# Patient Record
Sex: FEMALE | Race: ASIAN | NOT HISPANIC OR LATINO | URBAN - METROPOLITAN AREA
[De-identification: names, ages, dates, MRNs, and addresses within clinical notes are randomized per-mention and may not be internally consistent; named-entity substitution may affect disease eponyms.]

---

## 2017-01-09 ENCOUNTER — COMMUNICATION - HEALTHEAST (OUTPATIENT)
Dept: FAMILY MEDICINE | Facility: CLINIC | Age: 47
End: 2017-01-09

## 2017-01-09 DIAGNOSIS — I10 ESSENTIAL HYPERTENSION, BENIGN: ICD-10-CM

## 2017-01-09 DIAGNOSIS — E11.9 TYPE 2 DIABETES MELLITUS (H): ICD-10-CM

## 2017-01-12 ENCOUNTER — OFFICE VISIT - HEALTHEAST (OUTPATIENT)
Dept: FAMILY MEDICINE | Facility: CLINIC | Age: 47
End: 2017-01-12

## 2017-01-12 DIAGNOSIS — E11.9 TYPE 2 DIABETES MELLITUS (H): ICD-10-CM

## 2017-01-12 DIAGNOSIS — N60.81 SEBACEOUS CYST OF BREAST, RIGHT: ICD-10-CM

## 2017-01-12 DIAGNOSIS — E78.01 FAMILIAL HYPERCHOLESTEROLEMIA: ICD-10-CM

## 2017-01-13 LAB — HBA1C MFR BLD: 8.6 % (ref 4.2–6.1)

## 2017-01-16 ENCOUNTER — OFFICE VISIT - HEALTHEAST (OUTPATIENT)
Dept: SURGERY | Facility: CLINIC | Age: 47
End: 2017-01-16

## 2017-01-16 DIAGNOSIS — N63.0 BREAST MASS: ICD-10-CM

## 2017-01-16 ASSESSMENT — MIFFLIN-ST. JEOR: SCORE: 1338.49

## 2017-04-06 ENCOUNTER — COMMUNICATION - HEALTHEAST (OUTPATIENT)
Dept: FAMILY MEDICINE | Facility: CLINIC | Age: 47
End: 2017-04-06

## 2017-04-12 ENCOUNTER — OFFICE VISIT - HEALTHEAST (OUTPATIENT)
Dept: FAMILY MEDICINE | Facility: CLINIC | Age: 47
End: 2017-04-12

## 2017-04-12 DIAGNOSIS — R01.1 CARDIAC MURMUR: ICD-10-CM

## 2017-04-12 DIAGNOSIS — E78.00 PURE HYPERCHOLESTEROLEMIA: ICD-10-CM

## 2017-04-12 DIAGNOSIS — E11.9 TYPE 2 DIABETES MELLITUS (H): ICD-10-CM

## 2017-04-12 LAB — HBA1C MFR BLD: 7.9 % (ref 3.5–6)

## 2017-04-13 LAB
CHOLEST SERPL-MCNC: 164 MG/DL
FASTING STATUS PATIENT QL REPORTED: NO
HDLC SERPL-MCNC: 42 MG/DL
LDLC SERPL CALC-MCNC: 74 MG/DL
TRIGL SERPL-MCNC: 239 MG/DL

## 2017-05-16 ENCOUNTER — COMMUNICATION - HEALTHEAST (OUTPATIENT)
Dept: FAMILY MEDICINE | Facility: CLINIC | Age: 47
End: 2017-05-16

## 2017-05-16 DIAGNOSIS — I10 ESSENTIAL HYPERTENSION, BENIGN: ICD-10-CM

## 2017-05-18 ENCOUNTER — COMMUNICATION - HEALTHEAST (OUTPATIENT)
Dept: FAMILY MEDICINE | Facility: CLINIC | Age: 47
End: 2017-05-18

## 2017-06-29 ENCOUNTER — COMMUNICATION - HEALTHEAST (OUTPATIENT)
Dept: FAMILY MEDICINE | Facility: CLINIC | Age: 47
End: 2017-06-29

## 2017-07-05 ENCOUNTER — COMMUNICATION - HEALTHEAST (OUTPATIENT)
Dept: FAMILY MEDICINE | Facility: CLINIC | Age: 47
End: 2017-07-05

## 2017-07-06 ENCOUNTER — OFFICE VISIT - HEALTHEAST (OUTPATIENT)
Dept: FAMILY MEDICINE | Facility: CLINIC | Age: 47
End: 2017-07-06

## 2017-07-06 ENCOUNTER — AMBULATORY - HEALTHEAST (OUTPATIENT)
Dept: FAMILY MEDICINE | Facility: CLINIC | Age: 47
End: 2017-07-06

## 2017-07-06 DIAGNOSIS — N60.01 BREAST CYST, RIGHT: ICD-10-CM

## 2017-07-06 DIAGNOSIS — Z01.818 PRE-OP EXAM: ICD-10-CM

## 2017-07-06 ASSESSMENT — MIFFLIN-ST. JEOR: SCORE: 1320.35

## 2017-07-24 ENCOUNTER — COMMUNICATION - HEALTHEAST (OUTPATIENT)
Dept: FAMILY MEDICINE | Facility: CLINIC | Age: 47
End: 2017-07-24

## 2017-07-24 DIAGNOSIS — E11.9 TYPE 2 DIABETES MELLITUS (H): ICD-10-CM

## 2017-07-24 DIAGNOSIS — E78.5 HYPERLIPIDEMIA: ICD-10-CM

## 2018-05-08 ENCOUNTER — TELEPHONE (OUTPATIENT)
Dept: OTHER | Facility: OUTPATIENT CENTER | Age: 48
End: 2018-05-08

## 2018-05-08 NOTE — TELEPHONE ENCOUNTER
PER DANIKA @ [facility] [employer] - NO CO-PAY, 10% CO-INS, $2400 DEDUCT (0MET) W/ AN OOPM $4000 ($1611.00MET) NO AUTH TESTING/85270 - NO EXCLUSIONS, LVM TO DANIELA @ [facility]. REF #[id number].

## 2018-05-08 NOTE — TELEPHONE ENCOUNTER
[facility] Telephone Intake    Date:  May 8, 2018  Client Name:  Tonya Albert  Preferred Name: Ewelina      MRN:  [id number]   :  1970       Age:  47 year old     Presenting Problem / Reason for Assessment   (Clinical History &Symptoms):     Ewelina calling to complete intake for couples counseling. She was at work and could not speak to the details of why she is coming in for an appt.     Suggested Program:  REST    Length of time experiencing Symptoms: 11 years    Seen Other Providers (if so, where):  [facility]. : Dr. Vy Arguello [facility]  Therapist:  No  Psychiatrist:  No    Is presenting concern primarily sexual or mental health:      Couples:  Is client bringing partner: Not to DA    Has appointment been scheduled for partner:  Yes (18)  Will this be couples counseling:  Yes    Referral Source:  Dr. Vy Arguello [facility]    Legal:  n/a    Follow Up:    Insurance Benefits to be evaluated.  Note will be entered when validated.    Patient wishes to be contacted regarding Insurance benefits:  YES    Please Verify Registration    Please send Welcome Packet and document date sent.

## 2018-05-24 ENCOUNTER — COMMUNICATION - HEALTHEAST (OUTPATIENT)
Dept: FAMILY MEDICINE | Facility: CLINIC | Age: 48
End: 2018-05-24

## 2018-06-04 ENCOUNTER — OFFICE VISIT (OUTPATIENT)
Dept: OTHER | Facility: OUTPATIENT CENTER | Age: 48
End: 2018-06-04
Payer: COMMERCIAL

## 2018-06-04 DIAGNOSIS — F43.23 ADJUSTMENT DISORDER WITH MIXED ANXIETY AND DEPRESSED MOOD: ICD-10-CM

## 2018-06-04 ASSESSMENT — ANXIETY QUESTIONNAIRES
GAD7 TOTAL SCORE: 7
5. BEING SO RESTLESS THAT IT IS HARD TO SIT STILL: SEVERAL DAYS
3. WORRYING TOO MUCH ABOUT DIFFERENT THINGS: SEVERAL DAYS
1. FEELING NERVOUS, ANXIOUS, OR ON EDGE: SEVERAL DAYS
7. FEELING AFRAID AS IF SOMETHING AWFUL MIGHT HAPPEN: SEVERAL DAYS
2. NOT BEING ABLE TO STOP OR CONTROL WORRYING: SEVERAL DAYS
6. BECOMING EASILY ANNOYED OR IRRITABLE: SEVERAL DAYS

## 2018-06-04 ASSESSMENT — PATIENT HEALTH QUESTIONNAIRE - PHQ9: 5. POOR APPETITE OR OVEREATING: SEVERAL DAYS
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
## 2018-06-04 NOTE — MR AVS SNAPSHOT
After Visit Summary   2018    Tonya Albert    MRN: [id number]           Patient Information     Date Of Birth          1970        Visit Information        Provider Department      2018 11:00 AM Aleta Whalen PsyD [facility]        Today's Diagnoses     Adjustment disorder with mixed anxiety and depressed mood           Follow-ups after your visit        Who to contact     Please call your clinic at [phone] to:    Ask questions about your health    Make or cancel appointments    Discuss your medicines    Learn about your test results    Speak to your doctor            Additional Information About Your Visit        MyChart Information     [email or web address] is an electronic gateway that provides easy, online access to your medical records. With [email or web address], you can request a clinic appointment, read your test results, renew a prescription or communicate with your care team.     To sign up for [email or web address] visit the website at [email or web address]   You will be asked to enter the access code listed below, as well as some personal information. Please follow the directions to create your username and password.     Your access code is: [email or web address]  Expires: 2018  4:04 PM     Your access code will  in 90 days. If you need help or a new code, please contact your [facility] or call [phone] for assistance.        Care EveryWhere ID     This is your Care EveryWhere ID. This could be used by other organizations to access your [place] medical records  [id number]         Blood Pressure from Last 3 Encounters:   No data found for BP    Weight from Last 3 Encounters:   No data found for Wt              We Performed the Following     Diagnostic Assessment (complete) [28705]        Primary Care Provider    None Specified       No primary provider on file.        Equal Access to Services     AIXA ARCHER : kevin Perez  zoë alas waxay idiin hayaasolo aguayoarthur nickolasmark lasolissolo elvira. So [facility] [phone].    ATENCIÓN: Si kristal villalba, tiene a vidal disposición servicios gratuitos de asistencia lingüística. Llame al [phone].    We comply with applicable federal civil rights laws and [place] laws. We do not discriminate on the basis of race, color, national origin, age, disability, sex, sexual orientation, or gender identity.            Thank you!     Thank you for choosing [facility]  for your care. Our goal is always to provide you with excellent care. Hearing back from our patients is one way we can continue to improve our services. Please take a few minutes to complete the written survey that you may receive in the mail after your visit with us. Thank you!             Your Updated Medication List - Protect others around you: Learn how to safely use, store and throw away your medicines at www.disposemymeds.org.      Notice  As of 6/4/2018 11:59 PM    You have not been prescribed any medications.

## 2018-06-25 PROBLEM — F43.23 ADJUSTMENT DISORDER WITH MIXED ANXIETY AND DEPRESSED MOOD: Status: ACTIVE | Noted: 2018-06-25

## 2018-06-26 ASSESSMENT — PATIENT HEALTH QUESTIONNAIRE - PHQ9: SUM OF ALL RESPONSES TO PHQ QUESTIONS 1-9: 15

## 2018-06-26 ASSESSMENT — ANXIETY QUESTIONNAIRES: GAD7 TOTAL SCORE: 7

## 2018-07-01 NOTE — PROGRESS NOTES
I did not personally see the patient.  I reviewed and agree with the assessment and plan as documented in this note. Narda Vanegas, PhD, LP

## 2018-07-20 ENCOUNTER — OFFICE VISIT - HEALTHEAST (OUTPATIENT)
Dept: FAMILY MEDICINE | Facility: CLINIC | Age: 48
End: 2018-07-20

## 2018-07-20 DIAGNOSIS — I10 ESSENTIAL HYPERTENSION, BENIGN: ICD-10-CM

## 2018-07-20 DIAGNOSIS — Z12.31 VISIT FOR SCREENING MAMMOGRAM: ICD-10-CM

## 2018-07-20 DIAGNOSIS — E78.01 FAMILIAL HYPERCHOLESTEROLEMIA: ICD-10-CM

## 2018-07-20 DIAGNOSIS — E11.9 TYPE 2 DIABETES MELLITUS (H): ICD-10-CM

## 2018-07-20 LAB
ALBUMIN SERPL-MCNC: 3.9 G/DL (ref 3.5–5)
ALP SERPL-CCNC: 78 U/L (ref 45–120)
ALT SERPL W P-5'-P-CCNC: 34 U/L (ref 0–45)
ANION GAP SERPL CALCULATED.3IONS-SCNC: 11 MMOL/L (ref 5–18)
AST SERPL W P-5'-P-CCNC: 20 U/L (ref 0–40)
BASOPHILS # BLD AUTO: 0 THOU/UL (ref 0–0.2)
BASOPHILS NFR BLD AUTO: 0 % (ref 0–2)
BILIRUB SERPL-MCNC: 0.4 MG/DL (ref 0–1)
BUN SERPL-MCNC: 11 MG/DL (ref 8–22)
CALCIUM SERPL-MCNC: 9.4 MG/DL (ref 8.5–10.5)
CHLORIDE BLD-SCNC: 103 MMOL/L (ref 98–107)
CHOLEST SERPL-MCNC: 160 MG/DL
CO2 SERPL-SCNC: 24 MMOL/L (ref 22–31)
CREAT SERPL-MCNC: 0.76 MG/DL (ref 0.6–1.1)
CREAT UR-MCNC: 56.1 MG/DL
EOSINOPHIL # BLD AUTO: 0.1 THOU/UL (ref 0–0.4)
EOSINOPHIL NFR BLD AUTO: 1 % (ref 0–6)
ERYTHROCYTE [DISTWIDTH] IN BLOOD BY AUTOMATED COUNT: 11.7 % (ref 11–14.5)
FASTING STATUS PATIENT QL REPORTED: ABNORMAL
GFR SERPL CREATININE-BSD FRML MDRD: >60 ML/MIN/1.73M2
GLUCOSE BLD-MCNC: 317 MG/DL (ref 70–125)
HBA1C MFR BLD: 11.3 % (ref 3.5–6)
HCT VFR BLD AUTO: 42.3 % (ref 35–47)
HDLC SERPL-MCNC: 44 MG/DL
HGB BLD-MCNC: 15.1 G/DL (ref 12–16)
LDLC SERPL CALC-MCNC: 69 MG/DL
LYMPHOCYTES # BLD AUTO: 2.1 THOU/UL (ref 0.8–4.4)
LYMPHOCYTES NFR BLD AUTO: 36 % (ref 20–40)
MCH RBC QN AUTO: 30.3 PG (ref 27–34)
MCHC RBC AUTO-ENTMCNC: 35.7 G/DL (ref 32–36)
MCV RBC AUTO: 85 FL (ref 80–100)
MICROALBUMIN UR-MCNC: 2.19 MG/DL (ref 0–1.99)
MICROALBUMIN/CREAT UR: 39 MG/G
MONOCYTES # BLD AUTO: 0.4 THOU/UL (ref 0–0.9)
MONOCYTES NFR BLD AUTO: 7 % (ref 2–10)
NEUTROPHILS # BLD AUTO: 3.2 THOU/UL (ref 2–7.7)
NEUTROPHILS NFR BLD AUTO: 56 % (ref 50–70)
PLATELET # BLD AUTO: 244 THOU/UL (ref 140–440)
PMV BLD AUTO: 8.2 FL (ref 7–10)
POTASSIUM BLD-SCNC: 4.3 MMOL/L (ref 3.5–5)
PROT SERPL-MCNC: 6.9 G/DL (ref 6–8)
RBC # BLD AUTO: 4.98 MILL/UL (ref 3.8–5.4)
SODIUM SERPL-SCNC: 138 MMOL/L (ref 136–145)
TRIGL SERPL-MCNC: 234 MG/DL
WBC: 5.7 THOU/UL (ref 4–11)

## 2018-07-20 RX ORDER — ATORVASTATIN CALCIUM 20 MG/1
20 TABLET, FILM COATED ORAL AT BEDTIME
Qty: 90 TABLET | Refills: 0 | Status: SHIPPED | OUTPATIENT
Start: 2018-07-20

## 2018-07-20 ASSESSMENT — MIFFLIN-ST. JEOR: SCORE: 1342.45

## 2018-07-25 ENCOUNTER — COMMUNICATION - HEALTHEAST (OUTPATIENT)
Dept: PHARMACY | Facility: CLINIC | Age: 48
End: 2018-07-25

## 2018-08-03 ENCOUNTER — COMMUNICATION - HEALTHEAST (OUTPATIENT)
Dept: FAMILY MEDICINE | Facility: CLINIC | Age: 48
End: 2018-08-03

## 2018-11-27 ENCOUNTER — COMMUNICATION - HEALTHEAST (OUTPATIENT)
Dept: FAMILY MEDICINE | Facility: CLINIC | Age: 48
End: 2018-11-27

## 2018-11-27 DIAGNOSIS — I10 ESSENTIAL HYPERTENSION, BENIGN: ICD-10-CM

## 2018-11-27 DIAGNOSIS — E78.5 HYPERLIPIDEMIA: ICD-10-CM

## 2019-01-07 ENCOUNTER — COMMUNICATION - HEALTHEAST (OUTPATIENT)
Dept: FAMILY MEDICINE | Facility: CLINIC | Age: 49
End: 2019-01-07

## 2019-01-07 DIAGNOSIS — E78.5 HYPERLIPIDEMIA: ICD-10-CM

## 2019-02-05 ENCOUNTER — COMMUNICATION - HEALTHEAST (OUTPATIENT)
Dept: PHARMACY | Facility: CLINIC | Age: 49
End: 2019-02-05

## 2019-02-07 ENCOUNTER — OFFICE VISIT - HEALTHEAST (OUTPATIENT)
Dept: FAMILY MEDICINE | Facility: CLINIC | Age: 49
End: 2019-02-07

## 2019-02-07 DIAGNOSIS — E78.5 HYPERLIPIDEMIA: ICD-10-CM

## 2019-02-07 DIAGNOSIS — I10 ESSENTIAL HYPERTENSION, BENIGN: ICD-10-CM

## 2019-02-07 DIAGNOSIS — E11.9 TYPE 2 DIABETES MELLITUS (H): ICD-10-CM

## 2019-02-07 LAB
ALBUMIN SERPL-MCNC: 4.1 G/DL (ref 3.5–5)
ALP SERPL-CCNC: 53 U/L (ref 45–120)
ALT SERPL W P-5'-P-CCNC: 44 U/L (ref 0–45)
ANION GAP SERPL CALCULATED.3IONS-SCNC: 12 MMOL/L (ref 5–18)
AST SERPL W P-5'-P-CCNC: 25 U/L (ref 0–40)
BILIRUB SERPL-MCNC: 0.9 MG/DL (ref 0–1)
BUN SERPL-MCNC: 11 MG/DL (ref 8–22)
CALCIUM SERPL-MCNC: 9.4 MG/DL (ref 8.5–10.5)
CHLORIDE BLD-SCNC: 103 MMOL/L (ref 98–107)
CHOLEST SERPL-MCNC: 141 MG/DL
CO2 SERPL-SCNC: 23 MMOL/L (ref 22–31)
CREAT SERPL-MCNC: 0.75 MG/DL (ref 0.6–1.1)
FASTING STATUS PATIENT QL REPORTED: ABNORMAL
GFR SERPL CREATININE-BSD FRML MDRD: >60 ML/MIN/1.73M2
GLUCOSE BLD-MCNC: 160 MG/DL (ref 70–125)
HBA1C MFR BLD: 9.4 % (ref 3.5–6)
HDLC SERPL-MCNC: 43 MG/DL
LDLC SERPL CALC-MCNC: 70 MG/DL
POTASSIUM BLD-SCNC: 4 MMOL/L (ref 3.5–5)
PROT SERPL-MCNC: 7.1 G/DL (ref 6–8)
SODIUM SERPL-SCNC: 138 MMOL/L (ref 136–145)
TRIGL SERPL-MCNC: 142 MG/DL
TSH SERPL DL<=0.005 MIU/L-ACNC: 1.27 UIU/ML (ref 0.3–5)

## 2019-02-07 RX ORDER — LISINOPRIL 20 MG/1
20 TABLET ORAL DAILY
Qty: 90 TABLET | Refills: 0 | Status: SHIPPED | OUTPATIENT
Start: 2019-02-07

## 2019-02-07 ASSESSMENT — MIFFLIN-ST. JEOR: SCORE: 1298.21

## 2019-02-12 ENCOUNTER — COMMUNICATION - HEALTHEAST (OUTPATIENT)
Dept: HEALTH INFORMATION MANAGEMENT | Facility: CLINIC | Age: 49
End: 2019-02-12

## 2019-02-12 ENCOUNTER — COMMUNICATION - HEALTHEAST (OUTPATIENT)
Dept: TELEHEALTH | Facility: CLINIC | Age: 49
End: 2019-02-12

## 2019-04-25 ENCOUNTER — COMMUNICATION - HEALTHEAST (OUTPATIENT)
Dept: FAMILY MEDICINE | Facility: CLINIC | Age: 49
End: 2019-04-25

## 2019-05-10 ENCOUNTER — COMMUNICATION - HEALTHEAST (OUTPATIENT)
Dept: FAMILY MEDICINE | Facility: CLINIC | Age: 49
End: 2019-05-10

## 2019-05-10 DIAGNOSIS — E11.9 TYPE 2 DIABETES MELLITUS (H): ICD-10-CM

## 2019-11-01 ENCOUNTER — COMMUNICATION - HEALTHEAST (OUTPATIENT)
Dept: FAMILY MEDICINE | Facility: CLINIC | Age: 49
End: 2019-11-01

## 2021-05-28 NOTE — TELEPHONE ENCOUNTER
2nd attempt to reach patient. LMTCB and schedule a DM with PCP. Please assist with scheduling when patient calls back.    Will make one last follow-up call if needed in 1 week.

## 2021-05-28 NOTE — TELEPHONE ENCOUNTER
Left message to call back for: Pt  Information to relay to patient:  Last visit with PCP was back in 2/2018.  I was going to call pt to make a follow up appt for DM check for next month but seems like from last OFV, pt is moving to [place]. Please confirm if pt already moved and transferred out of care.  Did she establish care with a new MD in [place] already?  If so, please change PCP to no PCP.  If not, encourage pt to establish care with a new PCP.

## 2021-05-28 NOTE — TELEPHONE ENCOUNTER
Refill Approved    Rx renewed per Medication Renewal Policy. Medication was last renewed on 2/7/2019.   Last office visit: 2/7/2019 with Dr AVERY Espinoza. .    Josie Leiva, [facility] Triage/Med Refill 5/11/2019     Requested Prescriptions   Pending Prescriptions Disp Refills     metFORMIN (GLUCOPHAGE) 1000 MG tablet [Pharmacy Med Name: METFORMIN 1000MG TABLETS] 180 tablet 0     Sig: TAKE 1 TABLET BY MOUTH TWICE DAILY WITH MEALS       Metformin Refill Protocol Passed - 5/10/2019  2:13 PM        Passed - Blood pressure in last 12 months     BP Readings from Last 1 Encounters:   02/07/19 120/80             Passed - LFT or AST or ALT in last 12 months     Albumin   Date Value Ref Range Status   02/07/2019 4.1 3.5 - 5.0 g/dL Final     Bilirubin, Total   Date Value Ref Range Status   02/07/2019 0.9 0.0 - 1.0 mg/dL Final     Alkaline Phosphatase   Date Value Ref Range Status   02/07/2019 53 45 - 120 U/L Final     AST   Date Value Ref Range Status   02/07/2019 25 0 - 40 U/L Final     ALT   Date Value Ref Range Status   02/07/2019 44 0 - 45 U/L Final     Protein, Total   Date Value Ref Range Status   02/07/2019 7.1 6.0 - 8.0 g/dL Final                Passed - GFR or Serum Creatinine in last 6 months     GFR MDRD Non Af Amer   Date Value Ref Range Status   02/07/2019 >60 >60 mL/min/1.73m2 Final     GFR MDRD Af Amer   Date Value Ref Range Status   02/07/2019 >60 >60 mL/min/1.73m2 Final             Passed - Visit with PCP or prescribing provider visit in last 6 months or next 3 months     Last office visit with prescriber/PCP: 2/7/2019 OR same dept: 2/7/2019 Vy Espinoza MD OR same specialty: 2/7/2019 Vy Espinoza MD Last physical: Visit date not found Last MTM visit: Visit date not found         Next appt within 3 mo: Visit date not found  Next physical within 3 mo: Visit date not found  Prescriber OR PCP: Vy Espinoza MD  Last diagnosis associated with med order: 1. Type 2 diabetes mellitus (H)  - metFORMIN (GLUCOPHAGE)  1000 MG tablet [Pharmacy Med Name: METFORMIN 1000MG TABLETS]; TAKE 1 TABLET BY MOUTH TWICE DAILY WITH MEALS  Dispense: 180 tablet; Refill: 0     If protocol passes may refill for 12 months if within 3 months of last provider visit (or a total of 15 months).           Passed - A1C in last 6 months     Hemoglobin A1c   Date Value Ref Range Status   02/07/2019 9.4 (H) 3.5 - 6.0 % Final               Passed - Microalbumin in last year      Microalbumin, Random Urine   Date Value Ref Range Status   07/20/2018 2.19 (H) 0.00 - 1.99 mg/dL Final

## 2021-05-28 NOTE — TELEPHONE ENCOUNTER
LMTCB and schedule DM with PCP. Please assist with scheduling when patient calls back.     Will make follow-up call if needed in 1 week.

## 2021-05-28 NOTE — TELEPHONE ENCOUNTER
Talked with pt. She knows what she needs to schedule and she will call back to care connection or schedule via [employer]. No further action needed.

## 2021-05-30 VITALS — WEIGHT: 162.5 LBS | BODY MASS INDEX: 28.79 KG/M2

## 2021-05-30 VITALS — WEIGHT: 163.5 LBS | BODY MASS INDEX: 28.96 KG/M2

## 2021-05-30 VITALS — BODY MASS INDEX: 28.88 KG/M2 | WEIGHT: 163 LBS | HEIGHT: 63 IN

## 2021-05-31 VITALS — HEIGHT: 63 IN | BODY MASS INDEX: 28.17 KG/M2 | WEIGHT: 159 LBS

## 2021-06-01 VITALS — HEIGHT: 63 IN | WEIGHT: 163 LBS | BODY MASS INDEX: 28.88 KG/M2

## 2021-06-02 VITALS — WEIGHT: 154.12 LBS | HEIGHT: 63 IN | BODY MASS INDEX: 27.31 KG/M2

## 2021-06-08 NOTE — PROGRESS NOTES
HPI:  Patient presents today for their routine diabetes visit.    1) Diabetes Type 2  She has had a lot happen in the past year.  She had to change jobs, since she was about get laid off.  During that time, got chest pains 9/2016 and neighbor took her to [facility] and was admitted for observation.  Workup was negative.  EKG was negative.  TnI negative.  Her blood sugars ran high during her hospitalization.  She was discharged with metformin 1,000 BID, ran out, no refills, so went back to 750 mg once a day.  Chest pain was related to stress.  Workup was negative.  No chest pain since.  No chest pain with exertion and she is training for the [place] marathon.    Changes to their medication:  Increased to 1,000 BID during a chest pain workup, but ran out, so she had been taking 750 mg XR once daily these past 3 months.  Fasting blood sugars: 133 am fasting.  Evening 260s  Medication side effects:none.  Blood Pressures: at goal on lisinopril.  Hyperlipidemia: previously well controlled  Exercise: signed up for [place] marathon.  Diet: not as good as it could be.  Last Eye Exam: Per patient has upcoming appointment.    Other issues:     2) Sebaceous cyst: Would like sebaceous cyst at right bra line removed.      3) Hypercholesteremia:  Currently on zocor 20 mg.  Tolerating it well.  No issues with muscle aches.    ROS:  Gen: negative for weight gain  CV: No chest pain, palpitation, swelling in the extremities, dyspnea on exertion  RESP: No URI symptoms, shortness of breath  : No polyuria, hematuria  NEURO: No HA, numbness, tingling.    Objective:  Visit Vitals     /74 (Patient Site: Left Arm, Patient Position: Sitting)     Pulse 69     Wt 162 lb 8 oz (73.7 kg)     SpO2 98%     BMI 28.79 kg/m2     General appearance: alert, appears stated age and cooperative  Eyes: conjunctivae/corneas clear. PERRL, EOM's intact. Fundi benign.  Ears: normal TM's and external ear canals both ears  Nose: Nares normal. Septum  midline. Mucosa normal. No drainage or sinus tenderness.  Throat: lips, mucosa, and tongue normal; teeth and gums normal  Neck: no adenopathy, no carotid bruit, no JVD, supple, symmetrical, trachea midline and thyroid not enlarged, symmetric, no tenderness/mass/nodules  Lungs: clear to auscultation bilaterally  Heart: regular rate and rhythm, S1, S2 normal, no murmur, click, rub or gallop  Extremities: normal foot exam, normal monofilament.  Dryness and callus, but no cracking.    Lab:   A1C = 7.6    Assessment/Plan:  Tonya was seen today for diabetes and medication refill.    Diagnoses and all orders for this visit:    Type 2 diabetes mellitus  -     metFORMIN (GLUCOPHAGE) 1000 MG tablet; Take 1 tablet (1,000 mg total) by mouth 2 (two) times a day with meals.  -     Glycosylated Hemoglobin A1c  -     Comprehensive Metabolic Panel  -     Microalbumin, Random Urine  Increase metformin to 1,000 mg BID.    Patient is training for the marathon so hopefully her exercise regimen will improve.    Patient has issues with follow up and compliance.  I did ask her to schedule a 3 month follow up today.    Familial hypercholesterolemia  -     simvastatin (ZOCOR) 40 MG tablet; Take 1 tablet (40 mg total) by mouth bedtime.  -     Comprehensive Metabolic Panel  Increase simvastatin to 40 mg to meet ACC/AHA recommendations.  Will need to recheck liver function and lipid at upcoming visit.    Sebaceous cyst of breast, right  -     Ambulatory referral to General Surgery

## 2021-06-08 NOTE — PROGRESS NOTES
GENERAL SURGICAL CONSULTATION    I was requested by Vy Espinoza MD to consult on this pt to evaluate them for a mass in the right breast.    HPI:  This is a 46 y.o. female here today with a mass which is developed in the right breast.  It is at the 6 o'clock position along the inframammary fold.  The mass is about a centimeter in diameter.  It is nontender to palpation.  There is been no erythema or drainage associated with this mass.  The patient denies any axillary adenopathy.  She denies any family history of breast cancer.     Allergies:Review of patient's allergies indicates no known allergies.    History reviewed. No pertinent past medical history.    History reviewed. No pertinent past surgical history.    CURRENT MEDS:  Current Outpatient Prescriptions   Medication Sig Dispense Refill     levonorgestrel (MIRENA) 20 mcg/24 hr (5 years) IUD 1 each by Intrauterine route once.       lisinopril (PRINIVIL,ZESTRIL) 10 MG tablet TAKE ONE TABLET BY MOUTH DAILY 90 tablet 0     metFORMIN (GLUCOPHAGE) 1000 MG tablet Take 1 tablet (1,000 mg total) by mouth 2 (two) times a day with meals. 180 tablet 0     ONETOUCH ULTRA TEST strips USE TO TEST ONCE PER DAY AS NEEDED 100 strip 0     simvastatin (ZOCOR) 40 MG tablet Take 1 tablet (40 mg total) by mouth bedtime. 90 tablet 1     Current Facility-Administered Medications   Medication Dose Route Frequency Provider Last Rate Last Dose     ibuprofen tablet 600 mg (ADVIL,MOTRIN)  600 mg Oral Once Vy Espinoza MD           Family History   Problem Relation Age of Onset     Diabetes Father      Kidney failure Father      Hypertension Father      Hyperlipidemia Father      No Medical Problems Mother      Hypertension Brother      malignant hypertension 230mmHg 2015     No Medical Problems Sister      No Medical Problems Daughter      No Medical Problems Maternal Grandmother      No Medical Problems Maternal Grandfather      No Medical Problems Paternal Grandmother      No Medical  "Problems Paternal Grandfather      No Medical Problems Maternal Aunt      No Medical Problems Paternal Aunt      BRCA 1/2 Neg Hx      Breast cancer Neg Hx      Cancer Neg Hx      Colon cancer Neg Hx      Endometrial cancer Neg Hx      Ovarian cancer Neg Hx      Family history is not pertinent to this patients Chief Complaint.     reports that she has never smoked. She does not have any smokeless tobacco history on file. She reports that she does not use illicit drugs.    Review of Systems -   10 point Review of systems is negative except for; as mentioned above in HPI and PMHx    Visit Vitals     BP (!) 147/92 (Patient Site: Left Arm, Patient Position: Sitting, Cuff Size: Adult Large)     Pulse 67     Temp 98.1  F (36.7  C) (Oral)     Resp 16     Ht 5' 3\" (1.6 m)     Wt 163 lb (73.9 kg)     SpO2 97%     BMI 28.87 kg/m2     Body mass index is 28.87 kg/(m^2).    EXAM:  GENERAL: Well developed female  HEENT: EOMI, Anicteric Sclera, Moist Mucous Membranes,  In Mouth the pt does not have redness or bleeding gums  CARDIOVASCULAR: RRR w/out murmur   CHEST/LUNG: Clear to Auscultation  BREAST:  Right breast has a palpable 1 cm mass in the lower aspect of the right breast just below the level of the nipple areolar complex.  I do not appreciate any right sided axillary adenopathy.  The left breast exam is unremarkable I do not palpate any masses on the left breast or any left axillary adenopathy.  ABDOMEN:  Non tender to palpation, +BS  MUSCULOSKELETAL:  No deformities with good range of motion in all extremities  NEURO: She is ambulatory with good strength in both legs.  HEME/LYMPH: No Cervical Adenopathy or tenderness.     IMAGES:  RIGHT BREAST ULTRASOUND: Scans of the palpable abnormality 6:00 and 5 cm from nipple. There is a small round structure measuring 7 x 7 x 5 mm that appears to be dermal in location. This lesion has sharp borders and mild through transmission. Findings are  very consistent with a benign dermal " lesion, such as a sebaceous cyst.     IMPRESSION:   CONCLUSION:  Superficial lump inferior aspect of right breast most consistent with a benign dermal cyst or skin lesion. Further workup should be based on suspicion of clinical exam. Patient should return to annual screening protocol.     Assessment/Plan:  Patient has a mass in the right breast which is fairly well circumscribed and on ultrasound from 6 months ago looks to be consistent with that of a cyst.  Still this lesion has not gone away and I believe the safest way to proceed is to do an excisional biopsy and evaluate this mass under the microscope.    Excisional biopsy of right breast mass.      Wayne Schulte MD  [facility]  [phone]

## 2021-06-10 NOTE — PROGRESS NOTES
HPI:  Patient presents today for their routine diabetes visit.    Changes to their medication:  She is now taking metformin 1000 mg twice a day, but sometimes forgets her evening dose.  Last A1C was 8.9  Fasting blood sugars:140s  Medication side effects: none  Blood Pressures: at goal  Hyperlipidemia: on simvastatin 40 mg now.  She is tolerating it well.   Exercise: training for [place] marathon, currently running about 6 miles.  Diet: continuing to work on it.  Last Eye Exam: has not gotten an appointment yet.    Other issues:    Sebaceous cyst of the breast:  Needs a new referral for removal.    ROS:  Gen: negative for weight gain  CV: No chest pain, palpitation, swelling in the extremities, dyspnea on exertion  RESP: No URI symptoms, shortness of breath  : No polyuria, hematuria  NEURO: No HA, numbness, tingling.    Patient Active Problem List    Diagnosis Date Noted     Need for hepatitis vaccination 02/15/2015     Hepatitis vaccination status unknown 02/11/2015     Pure hypercholesterolemia 12/08/2014     Infected Nonvenomous Insect Bite      Lymphadenopathy      Benign Essential Hypertension      Acute Upper Respiratory Infection      Urinary Incontinence      Type 2 diabetes mellitus          Current Outpatient Prescriptions:      levonorgestrel (MIRENA) 20 mcg/24 hr (5 years) IUD, 1 each by Intrauterine route once., Disp: , Rfl:      lisinopril (PRINIVIL,ZESTRIL) 10 MG tablet, TAKE ONE TABLET BY MOUTH DAILY, Disp: 90 tablet, Rfl: 0     metFORMIN (GLUCOPHAGE) 1000 MG tablet, Take 1 tablet (1,000 mg total) by mouth 2 (two) times a day with meals., Disp: 180 tablet, Rfl: 0     ONETOUCH ULTRA TEST strips, USE TO TEST ONCE PER DAY AS NEEDED, Disp: 100 strip, Rfl: 0     simvastatin (ZOCOR) 40 MG tablet, Take 1 tablet (40 mg total) by mouth bedtime., Disp: 90 tablet, Rfl: 1    Current Facility-Administered Medications:      ibuprofen tablet 600 mg (ADVIL,MOTRIN), 600 mg, Oral, Once, Vy Espinoza,  MD      Objective:  /70 (Patient Site: Left Arm, Patient Position: Sitting)  Pulse 81  Wt 163 lb 8 oz (74.2 kg)  SpO2 99%  Breastfeeding? No Comment: IUD- mirena  BMI 28.96 kg/m2  General appearance: alert, appears stated age and cooperative  Eyes: conjunctivae/corneas clear. PERRL, EOM's intact. Fundi benign.  Ears: normal TM's and external ear canals both ears  Nose: Nares normal. Septum midline. Mucosa normal. No drainage or sinus tenderness.  Throat: lips, mucosa, and tongue normal; teeth and gums normal  Neck: no adenopathy, no carotid bruit, no JVD, supple, symmetrical, trachea midline and thyroid not enlarged, symmetric, no tenderness/mass/nodules  Lungs: clear to auscultation bilaterally  Heart: regular rate and rhythm, S1, S2 normal, no murmur, click, rub or gallop  Extremities: no calluses, normal monofilament.    Lab Results   Component Value Date    HGBA1C 7.9 (H) 04/12/2017     Assessment/Plan:  Tonya was seen today for diabetes.    Diagnoses and all orders for this visit:    Type 2 diabetes mellitus  -     Glycosylated Hemoglobin A1c   Barely at goal with metformin BID.   Discussed adding a GLP medication, but patient would prefer to continue to work on her exercise to improve her numbers as she trains for the [place] marathon.  Encouraged her to make her eye appointment.    Pure hypercholesterolemia  -     Lipid Cascade  Simvastatin was increased to 40 mg at the last visit.  She is tolerating the new dose well, no increase in muscle aches.  Will check her labs.    Cardiac murmur  -     HM1(CBC and Differential)  -     HM1 (CBC with Diff)  A slight 2/6 murmur heard today.  Patient just had a stress echo at [place] in 2016 due to an episode of chest pain.  No valvular abnormality.  Will check a CBC for anemia.      > 25 min spent with patient.  > 50% in counseling and coordination of care.

## 2021-06-11 NOTE — PROGRESS NOTES
"     Subjective:     Tonya is a 46 y.o. female here for a family medicine pre-operative consultation. The exam is requested by Dr. Kirkpatrick in preparation for right breast cyst to be performed at [facility] on July 11 2017. Today s examination on 07/07/17 is done to review the underlying surgical condition, clear for anesthesia, and review medical problems with appropriate changes in medications.  Right breast cyst has been present for several months, patient has been experiencing some mild discomfort, she like the cyst to be excised.  Tonya has tolerated previous surgeries well without bleeding or anesthesia difficulty.      Review of Systems  Constitutional: negative for anorexia, fatigue and malaise  Eyes: negative for redness  Ears, nose, mouth, throat, and face: negative for earaches, epistaxis, nasal congestion and sore throat  Respiratory: negative for cough, dyspnea on exertion and wheezing  Cardiovascular: negative for chest pain, fatigue, irregular heart beat, near-syncope, orthopnea, palpitations, paroxysmal nocturnal dyspnea and syncope  Gastrointestinal: negative for abdominal pain, change in bowel habits, constipation, diarrhea, dyspepsia, dysphagia, melena, nausea, odynophagia and vomiting  Genitourinary:negative for abnormal menstrual periods  Hematologic/lymphatic: negative for easy bruising and lymphadenopathy  Musculoskeletal:negative for arthralgias, bone pain, myalgias and stiff joints  Neurological: negative for coordination problems, dizziness, gait problems, headaches, tremors, vertigo and weakness  Behavioral/Psych: negative for anxiety, depression, fatigue and irritability  Endocrine: negative for temperature intolerance  Allergic/Immunologic: negative for urticaria      Objective:      /88 (Patient Site: Right Arm)  Pulse 68  Temp 97.8  F (36.6  C) (Oral)   Resp 14  Ht 5' 3\" (1.6 m)  Wt 159 lb (72.1 kg)  SpO2 96%  BMI 28.17 kg/m2    General Appearance:    Alert, " cooperative, no distress, appears stated age   Head:    Normocephalic, without obvious abnormality, atraumatic   Eyes:    PERRL, conjunctiva/corneas clear, EOM's intact, fundi     benign, both eyes        Ears:    Normal TM's and external ear canals, both ears   Nose:   Nares normal, septum midline, mucosa normal, no drainage    or sinus tenderness   Throat:   Lips, mucosa, and tongue normal; teeth and gums normal   Neck:   Supple, symmetrical, trachea midline, no adenopathy;        thyroid:  No enlargement/tenderness/nodules; no carotid    bruit or JVD   Back:     Symmetric, no curvature, ROM normal, no CVA tenderness   Lungs:     Clear to auscultation bilaterally, respirations unlabored   Chest wall:    No tenderness or deformity   Heart:    Regular rate and rhythm, S1 and S2 normal, no murmur, rub   or gallop   Abdomen:     Soft, non-tender, bowel sounds active all four quadrants,     no masses, no organomegaly   Musculoskeletal:   no limitation of range of motion, no joint tenderness    Extremities:   Extremities normal, atraumatic, no cyanosis or edema   Pulses:   2+ and symmetric all extremities   Skin:   Skin color, texture, turgor normal, no rashes or lesions   Lymph nodes:   Cervical, supraclavicular, and axillary nodes normal   Neurologic:   CNII-XII intact. Normal strength, sensation and reflexes       throughout     No past medical history on file.  No past surgical history on file.    Current Outpatient Prescriptions:      levonorgestrel (MIRENA) 20 mcg/24 hr (5 years) IUD, 1 each by Intrauterine route once., Disp: , Rfl:      lisinopril (PRINIVIL,ZESTRIL) 10 MG tablet, TAKE 1 TABLET BY MOUTH DAILY, Disp: 90 tablet, Rfl: 2     metFORMIN (GLUCOPHAGE) 1000 MG tablet, Take 1 tablet (1,000 mg total) by mouth 2 (two) times a day with meals., Disp: 180 tablet, Rfl: 0     ONETOUCH ULTRA TEST strips, USE TO TEST ONCE PER DAY AS NEEDED, Disp: 100 strip, Rfl: 0     simvastatin (ZOCOR) 40 MG tablet, Take 1 tablet  (40 mg total) by mouth bedtime., Disp: 90 tablet, Rfl: 1    Current Facility-Administered Medications:      ibuprofen tablet 600 mg (ADVIL,MOTRIN), 600 mg, Oral, Once, Vy Espinoza MD  No Known Allergies   reports that she has never smoked. She does not have any smokeless tobacco history on file. She reports that she does not use illicit drugs.       Predictors of intubation difficulty:   Morbid obesity? no}   Neck range of motion: normal   Dentition: No chipped, loose, or missing teeth.     Lab Review   Office Visit on 04/12/2017   Component Date Value     Hemoglobin A1c 04/12/2017 7.9*     Cholesterol 04/12/2017 164      Triglycerides 04/12/2017 239*     HDL Cholesterol 04/12/2017 42*     LDL Calculated 04/12/2017 74      Patient Fasting > 8hrs? 04/12/2017 No      WBC 04/12/2017 8.3      RBC 04/12/2017 4.98      Hemoglobin 04/12/2017 14.8      Hematocrit 04/12/2017 43.6      MCV 04/12/2017 88      MCH 04/12/2017 29.7      MCHC 04/12/2017 34.0      RDW 04/12/2017 14.2      Platelets 04/12/2017 269      MPV 04/12/2017 7.4      Neutrophils % 04/12/2017 67      Lymphocytes % 04/12/2017 26      Monocytes % 04/12/2017 6      Eosinophils % 04/12/2017 1      Basophils % 04/12/2017 1      Neutrophils Absolute 04/12/2017 5.5      Lymphocytes Absolute 04/12/2017 2.1      Monocytes Absolute 04/12/2017 0.5      Eosinophils Absolute 04/12/2017 0.1      Basophils Absolute 04/12/2017 0.0    Office Visit on 01/12/2017   Component Date Value     Hemoglobin A1c 01/12/2017 8.6*     Sodium 01/12/2017 139      Potassium 01/12/2017 4.2      Chloride 01/12/2017 107      CO2 01/12/2017 18*     Anion Gap, Calculation 01/12/2017 14      Glucose 01/12/2017 193*     BUN 01/12/2017 12      Creatinine 01/12/2017 0.72      GFR MDRD Af Amer 01/12/2017 >60      GFR MDRD Non Af Amer 01/12/2017 >60      Bilirubin, Total 01/12/2017 0.3      Calcium 01/12/2017 9.6      Protein, Total 01/12/2017 7.4      Albumin 01/12/2017 4.1      Alkaline Phosphatase  01/12/2017 62      AST 01/12/2017 24      ALT 01/12/2017 41      Microalbumin, Random Uri* 01/12/2017 1.75      Creatinine, Urine 01/12/2017 94.3      Microalbumin/Creatinine * 01/12/2017 18.6          Assessment:        46 y.o. female with planned surgery as above.    Known risk factors for perioperative complications: None    Difficulty with intubation is not anticipated.    Cardiac Risk Estimation: low    Current medications which may produce withdrawal symptoms if withheld perioperatively: none     Plan:     1. Preoperative workup as follows hemoglobin, hematocrit, electrolytes, creatinine, glucose, liver function studies.  2. Change in medication regimen before surgery: none, continue medication regimen including morning of surgery, with sip of water.hold NSAID  5. Deep vein thrombosis prophylaxis postoperatively:regimen to be chosen by surgical team.  8. Reviewed risks (not limited to bleeding,infection,pain,un-anticipated response to anesthesia ecc) and benefits (diagnostic and therapeutic) of surgery with patient, she understands the risks of the procedure and would like to proceed.  Patient's active problems diagnostically and therapeutically optimized for planned procedure with, Local or General anesthesia    Component Name Value Ref Range    mmHg    BPD 86 mmHg    010AR True     009AR True     010IS True     009CS True     Result Narrative   Report Status:Finalized  Stress Echocardiography Report    Demographics    Patient Name     BUTCHER        Height             62.99 Inches  RODRIGUEZ    Patient Number   [id number]          Weight             168 Pounds    Date of Birth    1970       BSA                1.8 m^2    Age              45               Tape Number:    Gender           Female           Study Date         02/04/2016 03:08 PM    Sonographer      MM               Ordering Physician GERMAN GONZALEZ    Referring        GERMAN GONZALEZ Interpreting       Symone Fischer  MD  Physician                         Physician          [id number]    Type of Study:    Stress procedure: [facility] EXERCISE STRESS ECHO    BP: 139/86 mmHg    Patient Status: RoutineTechnical Quality: Good visualization  Study Location: Echo LabContrast Medium: Definity. Amount- 0.8 ml    Indications    Indications for Study: Chest pain.    CONCLUSIONS    SUMMARY    The patient exercised for 13:41 minutes on the standard Fabian Protocol and  achieved a peak heart rate of 179 bpm representing 101% of age predicted  maximum heart rate and an estimated work load of 16.5 METs. Test was  terminated because of fatigue. Patient did not report chest pain.    Normal stress echocardiogram with a high degree of certainty.    1. No ischemia at the cardiac workload achieved.  2. Left ventricular function normal at rest, improved with stress.  3. No ischemic ECG response with adequate heart rate.  4. The patient did not report angina with stress.  5. Exercise capacity was excellent .  6. The baseline echocardiogram revealed no significant valvular  abnormalities.  7. The PA systolic pressure was 20 mm Hg + RA at rest.    ADDITIONAL REMARKS    Marked hypertensive response with peak blood pressure of 230/101 mm Hg  (the peak systolic pressure may have been overestimated, however was  certainly over 205 mm Hg).    No prior Echo for comparison.    Signature    ----------------------------------------------------------------  Electronically signed by Symone Fischer MD  [id number](Interpreting physician) on 02/04/2016 05:46 PM  ----------------------------------------------------------------    Rest    ECG  Normal sinus rhythm.    Standing HR:72 bpmStanding BP:126/86 mmHg    Pre-Stress Patient History  Chest pain    Hypertension  Diabetes    Due to concerns for respiratory related artifact post stress, Definity was  given for better endocardial definition.    Stress    Stress Type: Exercise Fabian    Peak HR: 179 bpm                          BP  Response: Normal  Peak BP: 230/101 mmHg                     HR BP Product: [id number]  Predicted HR: 177 bpm                     Max Exercise: 16.5 METS  % of predicted HR: 101                    Atropine Dose:  Test Duration: 13.68 min  Reason for Termination: Fatigue    Results    Global LVEF (rest): Normal (LVEF >50%)    Global LVEF (stress): Improved    ECG  Stress ECG is non diagnostic.    Structures    Left Ventricle    Global Longitudinal Strain:    Left Ventricle Summary  Normal estimated left ventricular ejection fraction at rest.  No wall motion abnormality at rest.  Normal estimated left ventricular ejection fraction improved with stress.  No wall motion abnormality with stress.     Result Impression   : ACR BI-RADS Category 0: Need Additional Imaging Evaluation     RECOMMENDATION:  Ultrasound of the right breast.    The results and recommendations of this examination will be communicated   to the patient and the imaging center will attempt to schedule any   recommended follow up with the patient.   Result Narrative     MM MAMMOGRAM SCREENING BILAT W LEENA W CAD performed on 6/19/17     Compared to: 05/07/2015 [facility] - MAMMO DIAG BILAT - IMAGES     FINDINGS: Bilateral screening mammogram was performed with the assistance   of Computer-Aided Detection and breast tomosynthesis. The breasts are   heterogeneously dense, which may obscure small masses.     There is a mass with circumscribed margins in the right breast at the 6   o'clock position at posterior depth.    The remainder of the breast tissue is unremarkable.     [facility]  MM US BREAST RT    INDICATION: Callback from abnormal screening mammogram.  COMPARISON: 06/19/2017, 05/07/2015    FINDINGS: Targeted ultrasound of the inferior right breast reveals a   benign sebaceous cyst at the 5:00 position zone 3 measuring 1.3 x 1.0 x   0.8 cm. This explains the mammographic abnormality. In fact, patient is   already scheduled for  excision of this lesion and had screening mammogram   after the surgical consultation.    ACR BI-RADS Category 2: Benign.     Annual screening mammogram recommended.    Recent Results (from the past 240 hour(s))   Pregnancy, Urine   Result Value Ref Range    Pregnancy Test, Urine Negative Negative    Specific Gravity, UA >1.030 (H) 1.001 - 1.030   HM2(CBC w/o Differential)   Result Value Ref Range    WBC 5.7 4.0 - 11.0 thou/uL    RBC 5.45 (H) 3.80 - 5.40 mill/uL    Hemoglobin 16.1 (H) 12.0 - 16.0 g/dL    Hematocrit 47.8 (H) 35.0 - 47.0 %    MCV 88 80 - 100 fL    MCH 29.5 27.0 - 34.0 pg    MCHC 33.7 32.0 - 36.0 g/dL    RDW 12.0 11.0 - 14.5 %    Platelets 266 140 - 440 thou/uL    MPV 7.0 7.0 - 10.0 fL   Comprehensive Metabolic Panel   Result Value Ref Range    Sodium 138 136 - 145 mmol/L    Potassium 3.6 3.5 - 5.0 mmol/L    Chloride 101 98 - 107 mmol/L    CO2 26 22 - 31 mmol/L    Anion Gap, Calculation 11 5 - 18 mmol/L    Glucose 223 (H) 70 - 125 mg/dL    BUN 13 8 - 22 mg/dL    Creatinine 0.84 0.60 - 1.10 mg/dL    GFR MDRD Af Amer >60 >60 mL/min/1.73m2    GFR MDRD Non Af Amer >60 >60 mL/min/1.73m2    Bilirubin, Total 0.6 0.0 - 1.0 mg/dL    Calcium 9.6 8.5 - 10.5 mg/dL    Protein, Total 7.9 6.0 - 8.0 g/dL    Albumin 4.3 3.5 - 5.0 g/dL    Alkaline Phosphatase 64 45 - 120 U/L    AST 28 0 - 40 U/L    ALT 52 (H) 0 - 45 U/L       Syed Pratt MD  [facility].  [address] [place] [address].  [place], [place], [address]  Ph:[phone]  Fax:[phone]

## 2021-06-22 NOTE — TELEPHONE ENCOUNTER
Refill Approved    Rx renewed per Medication Renewal Policy. Medication was last renewed on 7.20.18.    Marielena Alejandre, [facility] Triage/Med Refill 1/8/2019     Requested Prescriptions   Pending Prescriptions Disp Refills     simvastatin (ZOCOR) 20 MG tablet [Pharmacy Med Name: SIMVASTATIN 20MG TABLETS] 30 tablet 0     Sig: TAKE 1 TABLET BY MOUTH EVERY EVENING    Statins Refill Protocol (Hmg CoA Reductase Inhibitors) Passed - 1/7/2019  2:14 PM       Passed - PCP or prescribing provider visit in past 12 months     Last office visit with prescriber/PCP: 4/12/2017 Vy Espinoza MD OR same dept: Visit date not found OR same specialty: 4/12/2017 Vy Espinoza MD  Last physical: 7/20/2018 Last MTM visit: Visit date not found   Next visit within 3 mo: Visit date not found  Next physical within 3 mo: Visit date not found  Prescriber OR PCP: Vy Espinoza MD  Last diagnosis associated with med order: 1. Hyperlipidemia  - simvastatin (ZOCOR) 20 MG tablet [Pharmacy Med Name: SIMVASTATIN 20MG TABLETS]; TAKE 1 TABLET BY MOUTH EVERY EVENING  Dispense: 30 tablet; Refill: 0    If protocol passes may refill for 12 months if within 3 months of last provider visit (or a total of 15 months).

## 2021-06-22 NOTE — TELEPHONE ENCOUNTER
Patient Returning Call  Reason for call:  Returning call  Information relayed to patient:  Message below. Patient scheduled for PCP next available 2/7/19.   Patient has additional questions:  No  If YES, what are your questions/concerns:  N/A  Okay to leave a detailed message?: No call back needed

## 2021-06-23 NOTE — TELEPHONE ENCOUNTER
Called to set up MTM consult. No answer.  left voicemail.     If patient returns call, please reschedule MTM INITIAL appointment at [facility].     Coral Chandler, Pharm.D., [facility] HospitalCP  Medication Therapy Management Pharmacist  [facility]

## 2021-06-23 NOTE — PROGRESS NOTES
HPI:  Patient presents today for their routine diabetes visit.  Her last visit was in July in 2018.  She has a history of noncompliance.  She has been quite busy this fall, which contributed to this.    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  Will be flying back every other weekend.  House is sold.  Kids will be moving there in June.  Moving to [place].  Staying with relatives in the interim.  Mom and siblings in [facility].      Changes to their medication: metformin twice daily, trying to get prior auth for GLP injectable but is now changing jobs.    Fasting blood sugars: maybe twice a week about 135, sometimes 110  Medication side effects:no  Blood Pressures: at goal  Hyperlipidemia: on atorvastatin.  Exercise: Has been walking.  Hard in this weather.    Diet: watching what she is eating.  Lost 10 lbs.  Still doing lemon/ginger fasting.    Last Eye Exam:8 months ago.    Other issues:     2) HTN: No headache.  No issues with cough on her current dose of lisinopril.  Her blood pressure has improved with her weight loss.    2) Hyperlipidemia: Patient denies any muscle aches on her statin.  She is currently on atorvastatin.    ROS:  Gen: negative for weight gain  CV: No chest pain, palpitation, swelling in the extremities, dyspnea on exertion  RESP: No URI symptoms, shortness of breath    Patient Active Problem List    Diagnosis Date Noted     Pure hypercholesterolemia 12/08/2014     Benign Essential Hypertension      Type 2 diabetes mellitus (H)        Current Outpatient Medications:      atorvastatin (LIPITOR) 20 MG tablet, Take 1 tablet (20 mg total) by mouth at bedtime., Disp: 90 tablet, Rfl: 0     levonorgestrel (MIRENA) 20 mcg/24 hr (5 years) IUD, 1 each by Intrauterine route once., Disp: , Rfl:      ONETOUCH ULTRA TEST strips, USE TO TEST ONCE PER DAY AS NEEDED, Disp: 100 strip, Rfl: 0     lisinopril (PRINIVIL,ZESTRIL) 20 MG tablet, Take 1 tablet (20 mg total) by mouth  "daily., Disp: 90 tablet, Rfl: 0     metFORMIN (GLUCOPHAGE) 1000 MG tablet, Take 1 tablet (1,000 mg total) by mouth 2 (two) times a day with meals., Disp: 180 tablet, Rfl: 0    Current Facility-Administered Medications:      ibuprofen tablet 600 mg (ADVIL,MOTRIN), 600 mg, Oral, Once, Vy Espinoza MD      Objective:  /80 (Patient Site: Right Arm, Patient Position: Sitting, Cuff Size: Adult Regular)   Pulse 70   Ht 5' 3\" (1.6 m)   Wt 154 lb 1.9 oz (69.9 kg)   SpO2 98%   BMI 27.30 kg/m    General appearance: alert, appears stated age and cooperative  Ears: normal TM's and external ear canals both ears  Nose: Nares normal. Septum midline. Mucosa normal. No drainage or sinus tenderness.  Throat: lips, mucosa, and tongue normal; teeth and gums normal  Neck: no adenopathy, no thyromegaly.  Lungs: clear to auscultation bilaterally  Heart: regular rate and rhythm, S1, S2 normal, no murmur, click, rub or gallop  Extremities: normal foot exam, normal monofilament. Pulses 2+.  No calluses/ulcers.    Lab:   Lab Results   Component Value Date    HGBA1C 9.4 (H) 02/07/2019       Assessment/Plan:  Tonya was seen today for medication check.    Diagnoses and all orders for this visit:    Type 2 diabetes mellitus (H)  -     Glycosylated Hemoglobin A1c  -     [facility] RED  -     metFORMIN (GLUCOPHAGE) 1000 MG tablet; Take 1 tablet (1,000 mg total) by mouth 2 (two) times a day with meals.  -     sitaGLIPtin (JANUVIA) 50 MG tablet; Take 1 tablet (50 mg total) by mouth daily.  -     Comprehensive Metabolic Panel  -     Thyroid Stimulating Hormone (TSH)  Uncontrolled related to noncompliance since her diagnosis in 2012.  Often only comes once to twice a year for visits.  Since her last visit, her A1C has dramatically improved from 11.3 to 9.6.  She has not had any GLP injectables approved by her current insurance.  Hopefully this will change with her new job.  In the interim, will start her on Januvia to help further with her " blood sugars.  Patient has been a lot more goal directed with her diet.  Exercise has been hard for her with the weather as she primarily runs.  She is hoping to get back to that in [place].    Will check labs today.    Benign Essential Hypertension  -     lisinopril (PRINIVIL,ZESTRIL) 20 MG tablet; Take 1 tablet (20 mg total) by mouth daily.  Improved with her weight loss.  Encouraged her to continue on her path with this.    Hyperlipidemia  Atorvastatin 20 mg PO qday.  -     Lipid Cascade  Due for labs today.      I enforced that she will need to establish care with a new doctor while in [place] to manage her diabetes.  Discussed that her next visit should be within a month of moving there to establish care and consider a medication like trulicity if it would be covered by her new insurance.

## 2021-06-23 NOTE — PATIENT INSTRUCTIONS - HE
1) Establish care in [place].  2) Start Januvia 50 mg daily to lower your blood sugars  3) Start benefiber 1 TBS in 8 oz of fluid.  4) Start baby aspirin 81 mg with food daily.

## 2021-08-14 ENCOUNTER — HEALTH MAINTENANCE LETTER (OUTPATIENT)
Age: 51
End: 2021-08-14

## 2022-03-26 ENCOUNTER — HEALTH MAINTENANCE LETTER (OUTPATIENT)
Age: 52
End: 2022-03-26

## 2022-08-28 NOTE — LETTER
Letter by Raymundo Pelletier at      Author: Raymundo Pelletier Service: -- Author Type: --    Filed:  Encounter Date: 2/12/2019 Status: (Other)       February 12, 2019       Tonya Daniel  [address] [place] [address]    Dear Tonya Daniel:    We are pleased to provide you with secure, online access to medical information for you and your family within [employer]. Per your request, we have expanded your account to allow access to the records of the following family members:              Luz Marina ALLAN Albert (privilege ends on 6/18/2019.)            Deric VARELA Roosevelt (privilege ends on 2/12/2020.)     How Do I Log In?  1. In your Internet browser, go to [employer]  2. Log into [employer] using your [employer] Username and Password.  3. Click Sign In.        How Do I Access a Family Member's Account?  4. Select the account you want to access by clicking the [place] with the appropriate patient's name at the top of your screen.   5. You will see a disclaimer page letting you know that you will be viewing a family member's record. Review the disclaimer and then click Accept Proxy Access Disclaimer to proceed.  6. Once you switch to viewing a family member's record, you can navigate to [employer] pages the same way you would for yourself. You can return to your own account by clicking the [place] at the top of the screen with your name on it.    7. To customize the colors and names of the linked accounts, you can select Personalize from the Profile dropdown menu at the top of the screen, then click the Edit button to make changes.     Additional Information  If you have questions, visit [email or web address], e-mail [email or web address] or call [phone] to talk to our [employer] staff. Remember, [employer] is NOT to be used for urgent needs. For medical emergencies, dial 911.             
4

## 2022-09-18 ENCOUNTER — HEALTH MAINTENANCE LETTER (OUTPATIENT)
Age: 52
End: 2022-09-18

## 2023-10-08 ENCOUNTER — HEALTH MAINTENANCE LETTER (OUTPATIENT)
Age: 53
End: 2023-10-08